# Patient Record
Sex: MALE | Race: OTHER | HISPANIC OR LATINO | Employment: FULL TIME | URBAN - METROPOLITAN AREA
[De-identification: names, ages, dates, MRNs, and addresses within clinical notes are randomized per-mention and may not be internally consistent; named-entity substitution may affect disease eponyms.]

---

## 2018-05-18 ENCOUNTER — OFFICE VISIT (OUTPATIENT)
Dept: FAMILY MEDICINE CLINIC | Facility: CLINIC | Age: 23
End: 2018-05-18
Payer: COMMERCIAL

## 2018-05-18 VITALS
HEIGHT: 68 IN | WEIGHT: 211.4 LBS | OXYGEN SATURATION: 98 % | BODY MASS INDEX: 32.04 KG/M2 | RESPIRATION RATE: 16 BRPM | SYSTOLIC BLOOD PRESSURE: 110 MMHG | TEMPERATURE: 97.8 F | HEART RATE: 88 BPM | DIASTOLIC BLOOD PRESSURE: 72 MMHG

## 2018-05-18 DIAGNOSIS — Z23 NEED FOR DIPHTHERIA-TETANUS-PERTUSSIS (TDAP) VACCINE: ICD-10-CM

## 2018-05-18 DIAGNOSIS — L74.510 HYPERHIDROSIS OF AXILLA: Primary | ICD-10-CM

## 2018-05-18 DIAGNOSIS — M20.009 ACQUIRED DEFORMITY OF FINGER, UNSPECIFIED LATERALITY: ICD-10-CM

## 2018-05-18 DIAGNOSIS — Z00.00 ROUTINE GENERAL MEDICAL EXAMINATION AT A HEALTH CARE FACILITY: ICD-10-CM

## 2018-05-18 DIAGNOSIS — E66.09 CLASS 1 OBESITY DUE TO EXCESS CALORIES WITHOUT SERIOUS COMORBIDITY WITH BODY MASS INDEX (BMI) OF 32.0 TO 32.9 IN ADULT: ICD-10-CM

## 2018-05-18 PROCEDURE — 99203 OFFICE O/P NEW LOW 30 MIN: CPT | Performed by: PHYSICIAN ASSISTANT

## 2018-05-18 PROCEDURE — 3725F SCREEN DEPRESSION PERFORMED: CPT | Performed by: PHYSICIAN ASSISTANT

## 2018-05-18 PROCEDURE — 1036F TOBACCO NON-USER: CPT | Performed by: PHYSICIAN ASSISTANT

## 2018-05-18 PROCEDURE — 3008F BODY MASS INDEX DOCD: CPT | Performed by: PHYSICIAN ASSISTANT

## 2018-05-18 NOTE — ASSESSMENT & PLAN NOTE
- Discussed importance of diet for management of weight  Encouraged both aerobic and resistance exercise 150 minutes a week  Pt is satisfied with his weight and activity level  - CBC, CMP, TSH, Lipid panel  Pt want the lab slips but stated he does not like needles and will not get these done

## 2018-05-18 NOTE — PROGRESS NOTES
Assessment/Plan:    Class 1 obesity due to excess calories without serious comorbidity with body mass index (BMI) of 32 0 to 32 9 in adult  - Discussed importance of diet for management of weight  Encouraged both aerobic and resistance exercise 150 minutes a week  Pt is satisfied with his weight and activity level  - CBC, CMP, TSH, Lipid panel  Pt want the lab slips but stated he does not like needles and will not get these done  Need for diphtheria-tetanus-pertussis (Tdap) vaccine  - Recommended Tetanus vaccination  Pt will return after checking when he last his vaccine  Hyperhidrosis of axilla  - Drysol to be allied nightly  - FU in 3 months    Acquired deformity of finger  Unknown etiology, no irritation   - Recommended continued observation  - Referral to Dermatologist       Diagnoses and all orders for this visit:    Hyperhidrosis of axilla  -     Discontinue: aluminum chloride (DRYSOL) 20 % external solution; Apply topically daily at bedtime  -     aluminum chloride (DRYSOL) 20 % external solution; Apply topically daily at bedtime    Class 1 obesity due to excess calories without serious comorbidity with body mass index (BMI) of 32 0 to 32 9 in adult  -     Lipid panel; Future  -     CBC and differential; Future  -     Comprehensive metabolic panel; Future  -     TSH, 3rd generation with T4 reflex; Future  -     Lipid panel  -     CBC and differential  -     Comprehensive metabolic panel  -     TSH, 3rd generation with T4 reflex    Need for diphtheria-tetanus-pertussis (Tdap) vaccine    Routine general medical examination at a health care facility    Acquired deformity of finger, unspecified laterality  -     Ambulatory referral to Dermatology; Future          Subjective:      Patient ID: Sharron Nicole is a 20 y o  male, here for an annual wellness visit  He wants a solution to his excessive axillary sweating   He has had significant sweatiness for most of his life but 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  He has tried every deodorant with no change  It causes no pain and no change  He is also inquiring about the slight skin peeling of finger tips which has been occurring for the past year  It is most noticeable on his thumbs  He will have to peal the skin off in the shower approximately every few months when it does not come off on its own  He denies any contact with an irritation, cleaning solution or substance that started this  He does not have any pain and no other skin pealing anywhere else on his body  Prior PCP: Dr Fay Blanc  Previous Dental Visit: 6 months prior, no known dental issues  Previous Eye Exam: Never prior, over time worsening vision    Prior Vaccines:   - Last Tetanus vaccine: 9 years prior    Diet: high in carbohydrates  Activity: twice weekly rides bike    The following portions of the patient's history were reviewed and updated as appropriate: allergies, current medications, past family history, past medical history, past social history, past surgical history and problem list     Review of Systems   Constitutional: Negative for activity change, chills, fatigue, fever and unexpected weight change  HENT: Negative for rhinorrhea and sore throat  Respiratory: Negative for cough, shortness of breath and wheezing  Cardiovascular: Negative for chest pain, palpitations and leg swelling  Gastrointestinal: Negative for constipation, diarrhea, nausea and vomiting  Genitourinary: Negative for urgency  Musculoskeletal: Negative for back pain, neck pain and neck stiffness  Allergic/Immunologic: Positive for environmental allergies  Negative for food allergies  Neurological: Negative for dizziness, seizures, weakness and headaches  Psychiatric/Behavioral: Negative for dysphoric mood and sleep disturbance  The patient is not nervous/anxious          Social History     Social History    Marital status: Single     Spouse name: N/A    Number of children: N/A    Years of education: N/A     Occupational History    Not on file  Social History Main Topics    Smoking status: Never Smoker    Smokeless tobacco: Never Used      Comment: 1 cigar a week    Alcohol use 1 8 oz/week     3 Shots of liquor per week      Comment: rare    Drug use: No    Sexual activity: No     Other Topics Concern    Not on file     Social History Narrative    No narrative on file         Objective:  /72   Pulse 88   Temp 97 8 °F (36 6 °C)   Resp 16   Ht 5' 8" (1 727 m)   Wt 95 9 kg (211 lb 6 4 oz)   SpO2 98%   BMI 32 14 kg/m²      Physical Exam   Constitutional: He appears well-developed and well-nourished  HENT:   Head: Normocephalic and atraumatic  Right Ear: Tympanic membrane and external ear normal    Left Ear: Tympanic membrane and external ear normal    Nose: Nose normal  No rhinorrhea  Mouth/Throat: Oropharynx is clear and moist  No oropharyngeal exudate  Neck: Normal range of motion  No thyromegaly present  Cardiovascular: Normal rate, regular rhythm and normal heart sounds  Exam reveals no gallop and no friction rub  No murmur heard  Pulmonary/Chest: Effort normal and breath sounds normal  He has no wheezes  He has no rales  Abdominal: Soft  Bowel sounds are normal  He exhibits no distension  There is no tenderness  There is no rebound and no guarding  Musculoskeletal: Normal range of motion  Lymphadenopathy:        Head (right side): No submental, no submandibular, no tonsillar, no preauricular and no posterior auricular adenopathy present  Head (left side): No submental, no submandibular, no tonsillar, no preauricular and no posterior auricular adenopathy present  He has no cervical adenopathy  Skin: Skin is warm and intact  He is not diaphoretic  No pallor  Psychiatric: He has a normal mood and affect   His behavior is normal  Thought content normal

## 2019-01-11 ENCOUNTER — OFFICE VISIT (OUTPATIENT)
Dept: FAMILY MEDICINE CLINIC | Facility: CLINIC | Age: 24
End: 2019-01-11
Payer: COMMERCIAL

## 2019-01-11 VITALS
HEART RATE: 85 BPM | WEIGHT: 221.6 LBS | HEIGHT: 68 IN | RESPIRATION RATE: 18 BRPM | SYSTOLIC BLOOD PRESSURE: 122 MMHG | DIASTOLIC BLOOD PRESSURE: 80 MMHG | OXYGEN SATURATION: 97 % | BODY MASS INDEX: 33.59 KG/M2

## 2019-01-11 DIAGNOSIS — L74.510 HYPERHIDROSIS OF AXILLA: Primary | ICD-10-CM

## 2019-01-11 DIAGNOSIS — Z23 NEED FOR DIPHTHERIA-TETANUS-PERTUSSIS (TDAP) VACCINE: ICD-10-CM

## 2019-01-11 DIAGNOSIS — E66.09 CLASS 1 OBESITY DUE TO EXCESS CALORIES WITHOUT SERIOUS COMORBIDITY WITH BODY MASS INDEX (BMI) OF 32.0 TO 32.9 IN ADULT: ICD-10-CM

## 2019-01-11 PROCEDURE — 3008F BODY MASS INDEX DOCD: CPT | Performed by: FAMILY MEDICINE

## 2019-01-11 PROCEDURE — 99214 OFFICE O/P EST MOD 30 MIN: CPT | Performed by: FAMILY MEDICINE

## 2019-01-11 RX ORDER — MULTIVITAMIN
1 TABLET ORAL DAILY
COMMUNITY

## 2019-01-11 NOTE — PROGRESS NOTES
Assessment/Plan:    Hyperhidrosis of axilla  Continued symptoms  Well managed on Drysol  - Drysol to be applied nightly  Refill sent  - Referral to Dermatologist    Class 1 obesity due to excess calories without serious comorbidity with body mass index (BMI) of 32 0 to 32 9 in adult  - Discussed importance of diet for management of weight  Encouraged both aerobic and resistance exercise 150 minutes a week  Pt is satisfied with his weight and activity level  - Recommended getting labwork  Patient states he will not get labs drawn because he "hates" needles  Need for diphtheria-tetanus-pertussis (Tdap) vaccine  - Recommended once again  Patient declined  Subjective:    Patient ID: Christina Dwyer is a 24 y o  male  Pt is presenting today for Continued excessive axillary sweatiness  He was given Drysol 8 months prior which improved his symptoms  However he notices that about twice a month he gets red burning sensation which lasts for a day  Described at "red ants biting "    He is requesting a referral to see dermatology for long term management  He has increased his weight and has not made any changes to his health to improve  He does not exercise or watch his diet  The following portions of the patient's history were reviewed and updated as appropriate: allergies, current medications, past social history and problem list     Review of Systems      Objective:  /80   Pulse 85   Resp 18   Ht 5' 8" (1 727 m)   Wt 101 kg (221 lb 9 6 oz)   SpO2 97%   BMI 33 69 kg/m²      Physical Exam   Constitutional: He is oriented to person, place, and time  He appears well-developed and well-nourished  No distress  HENT:   Head: Normocephalic and atraumatic  Cardiovascular: Normal rate, regular rhythm, normal heart sounds and intact distal pulses  Exam reveals no gallop and no friction rub  No murmur heard  Pulmonary/Chest: Effort normal and breath sounds normal  No respiratory distress  Neurological: He is alert and oriented to person, place, and time  Skin: Skin is warm and dry  He is not diaphoretic  Normal appearing axilla   Psychiatric: He has a normal mood and affect  His behavior is normal  Thought content normal    Vitals reviewed

## 2019-01-11 NOTE — ASSESSMENT & PLAN NOTE
Continued symptoms  Well managed on Drysol  - Drysol to be applied nightly   Refill sent  - Referral to Dermatologist

## 2019-01-11 NOTE — ASSESSMENT & PLAN NOTE
- Discussed importance of diet for management of weight  Encouraged both aerobic and resistance exercise 150 minutes a week  Pt is satisfied with his weight and activity level  - Recommended getting labwork  Patient states he will not get labs drawn because he "hates" needles

## 2019-01-21 ENCOUNTER — HOSPITAL ENCOUNTER (EMERGENCY)
Facility: HOSPITAL | Age: 24
Discharge: HOME | End: 2019-01-21
Attending: EMERGENCY MEDICINE
Payer: COMMERCIAL

## 2019-01-21 ENCOUNTER — APPOINTMENT (EMERGENCY)
Dept: CT IMAGING | Facility: HOSPITAL | Age: 24
End: 2019-01-21
Payer: COMMERCIAL

## 2019-01-21 VITALS
OXYGEN SATURATION: 99 % | TEMPERATURE: 98.6 F | BODY MASS INDEX: 33.75 KG/M2 | RESPIRATION RATE: 16 BRPM | SYSTOLIC BLOOD PRESSURE: 142 MMHG | WEIGHT: 222.66 LBS | HEIGHT: 68 IN | DIASTOLIC BLOOD PRESSURE: 77 MMHG | HEART RATE: 75 BPM

## 2019-01-21 DIAGNOSIS — R10.32 LEFT INGUINAL PAIN: Primary | ICD-10-CM

## 2019-01-21 LAB
ANION GAP SERPL CALCULATED.3IONS-SCNC: 9 MMOL/L (ref 4–13)
BASOPHILS # BLD AUTO: 0.01 THOUSANDS/ΜL (ref 0–0.1)
BASOPHILS NFR BLD AUTO: 0 % (ref 0–1)
BUN SERPL-MCNC: 13 MG/DL (ref 5–25)
CALCIUM SERPL-MCNC: 9 MG/DL (ref 8.3–10.1)
CHLORIDE SERPL-SCNC: 106 MMOL/L (ref 100–108)
CO2 SERPL-SCNC: 28 MMOL/L (ref 21–32)
CREAT SERPL-MCNC: 1.03 MG/DL (ref 0.6–1.3)
EOSINOPHIL # BLD AUTO: 0.08 THOUSAND/ΜL (ref 0–0.61)
EOSINOPHIL NFR BLD AUTO: 1 % (ref 0–6)
ERYTHROCYTE [DISTWIDTH] IN BLOOD BY AUTOMATED COUNT: 12.5 % (ref 11.6–15.1)
GFR SERPL CREATININE-BSD FRML MDRD: 101 ML/MIN/1.73SQ M
GLUCOSE SERPL-MCNC: 120 MG/DL (ref 65–140)
HCT VFR BLD AUTO: 48 % (ref 36.5–49.3)
HGB BLD-MCNC: 16.5 G/DL (ref 12–17)
IMM GRANULOCYTES # BLD AUTO: 0.01 THOUSAND/UL (ref 0–0.2)
IMM GRANULOCYTES NFR BLD AUTO: 0 % (ref 0–2)
LYMPHOCYTES # BLD AUTO: 1.55 THOUSANDS/ΜL (ref 0.6–4.47)
LYMPHOCYTES NFR BLD AUTO: 27 % (ref 14–44)
MCH RBC QN AUTO: 29.9 PG (ref 26.8–34.3)
MCHC RBC AUTO-ENTMCNC: 34.4 G/DL (ref 31.4–37.4)
MCV RBC AUTO: 87 FL (ref 82–98)
MONOCYTES # BLD AUTO: 0.7 THOUSAND/ΜL (ref 0.17–1.22)
MONOCYTES NFR BLD AUTO: 12 % (ref 4–12)
NEUTROPHILS # BLD AUTO: 3.36 THOUSANDS/ΜL (ref 1.85–7.62)
NEUTS SEG NFR BLD AUTO: 60 % (ref 43–75)
NRBC BLD AUTO-RTO: 0 /100 WBCS
PLATELET # BLD AUTO: 212 THOUSANDS/UL (ref 149–390)
PMV BLD AUTO: 9.4 FL (ref 8.9–12.7)
POTASSIUM SERPL-SCNC: 4.1 MMOL/L (ref 3.5–5.3)
RBC # BLD AUTO: 5.51 MILLION/UL (ref 3.88–5.62)
SODIUM SERPL-SCNC: 143 MMOL/L (ref 136–145)
WBC # BLD AUTO: 5.71 THOUSAND/UL (ref 4.31–10.16)

## 2019-01-21 PROCEDURE — 99284 EMERGENCY DEPT VISIT MOD MDM: CPT

## 2019-01-21 PROCEDURE — 74177 CT ABD & PELVIS W/CONTRAST: CPT

## 2019-01-21 PROCEDURE — 80048 BASIC METABOLIC PNL TOTAL CA: CPT | Performed by: EMERGENCY MEDICINE

## 2019-01-21 PROCEDURE — 36415 COLL VENOUS BLD VENIPUNCTURE: CPT | Performed by: EMERGENCY MEDICINE

## 2019-01-21 PROCEDURE — 85025 COMPLETE CBC W/AUTO DIFF WBC: CPT | Performed by: EMERGENCY MEDICINE

## 2019-01-21 RX ORDER — NAPROXEN 500 MG/1
500 TABLET ORAL 2 TIMES DAILY PRN
Qty: 20 TABLET | Refills: 0 | Status: SHIPPED | OUTPATIENT
Start: 2019-01-21

## 2019-01-21 RX ADMIN — IOHEXOL 100 ML: 350 INJECTION, SOLUTION INTRAVENOUS at 12:58

## 2019-01-21 NOTE — ED NOTES
Pt provided verbal understanding of all discharge instructions   Pt ambulated to waiting room, steady gait noted     Shivani Jennings RN  01/21/19 3299

## 2019-01-21 NOTE — ED PROVIDER NOTES
History  Chief Complaint   Patient presents with    Groin Pain     L groin pain  [facility], sent him here to r/o hernia     43-year-old male, intermittent left groin pain over the past few days, better with rest worse with movement palpation , nonradiating, current pain score is 6/10  Patient seen at urgent care center, they were concern for incarcerated hernia and sent patient here        History provided by:  Patient  Groin Pain   Presenting symptoms: no dysuria    Presenting symptoms comment:  Left inguinal pain  Context: not after injury, not after intercourse, not after urination and not during urination    Relieved by:  Rest  Worsened by: Movement  Ineffective treatments:  None tried  Associated symptoms: groin pain    Associated symptoms: no abdominal pain, no diarrhea, no fever, no flank pain, no genital itching, no genital lesions, no genital rash, no nausea, no penile redness, no penile swelling, no priapism, no scrotal swelling, no urinary frequency, no urinary hesitation, no urinary incontinence, no urinary retention and no vomiting    Risk factors: no bladder surgery and no kidney stones        Prior to Admission Medications   Prescriptions Last Dose Informant Patient Reported? Taking? Multiple Vitamin (MULTIVITAMIN) tablet   Yes Yes   Sig: Take 1 tablet by mouth daily   aluminum chloride (DRYSOL) 20 % external solution   No Yes   Sig: Apply topically daily at bedtime      Facility-Administered Medications: None       History reviewed  No pertinent past medical history  Past Surgical History:   Procedure Laterality Date    ARTHROSCOPIC REPAIR ACL         History reviewed  No pertinent family history  I have reviewed and agree with the history as documented      Social History   Substance Use Topics    Smoking status: Never Smoker    Smokeless tobacco: Never Used      Comment: 1 cigar a week    Alcohol use 1 8 oz/week     3 Shots of liquor per week      Comment: rare        Review of Systems   Constitutional: Negative for activity change, chills, diaphoresis and fever  HENT: Negative for congestion, sinus pressure and sore throat  Eyes: Negative for pain and visual disturbance  Respiratory: Negative for cough, chest tightness, shortness of breath, wheezing and stridor  Cardiovascular: Negative for chest pain and palpitations  Gastrointestinal: Negative for abdominal distention, abdominal pain, constipation, diarrhea, nausea and vomiting  Genitourinary: Negative for bladder incontinence, dysuria, flank pain, frequency, hesitancy, penile swelling and scrotal swelling  Musculoskeletal: Negative for neck pain and neck stiffness  Skin: Negative for rash  Neurological: Negative for dizziness, speech difficulty, light-headedness, numbness and headaches  Physical Exam  Physical Exam   Constitutional: He is oriented to person, place, and time  He appears well-developed  No distress  HENT:   Head: Normocephalic and atraumatic  Eyes: Pupils are equal, round, and reactive to light  Neck: Normal range of motion  Neck supple  No tracheal deviation present  Cardiovascular: Normal rate, regular rhythm, normal heart sounds and intact distal pulses  No murmur heard  Pulmonary/Chest: Effort normal and breath sounds normal  No stridor  No respiratory distress  Abdominal: Soft  He exhibits no distension  There is tenderness  There is no rebound and no guarding  Left lower quadrant left inguinal tenderness   Musculoskeletal: Normal range of motion  Neurological: He is alert and oriented to person, place, and time  Skin: Skin is warm and dry  He is not diaphoretic  No erythema  No pallor  Psychiatric: He has a normal mood and affect  Vitals reviewed        Vital Signs  ED Triage Vitals   Temperature Pulse Respirations Blood Pressure SpO2   01/21/19 1157 01/21/19 1158 01/21/19 1158 01/21/19 1158 01/21/19 1158   98 6 °F (37 °C) 75 16 142/77 99 %      Temp src Heart Rate Source Patient Position - Orthostatic VS BP Location FiO2 (%)   -- 01/21/19 1158 01/21/19 1158 01/21/19 1158 --    Monitor Sitting Right arm       Pain Score       01/21/19 1158       6           Vitals:    01/21/19 1158   BP: 142/77   Pulse: 75   Patient Position - Orthostatic VS: Sitting       Visual Acuity      ED Medications  Medications   iohexol (OMNIPAQUE) 350 MG/ML injection (MULTI-DOSE) 100 mL (100 mL Intravenous Given 1/21/19 1258)       Diagnostic Studies  Results Reviewed     Procedure Component Value Units Date/Time    Basic metabolic panel [[id number]] Collected:  01/21/19 1213    Lab Status:  Final result Specimen:  Blood from Arm, Right Updated:  01/21/19 1236     Sodium 143 mmol/L      Potassium 4 1 mmol/L      Chloride 106 mmol/L      CO2 28 mmol/L      ANION GAP 9 mmol/L      BUN 13 mg/dL      Creatinine 1 03 mg/dL      Glucose 120 mg/dL      Calcium 9 0 mg/dL      eGFR 101 ml/min/1 73sq m     Narrative:         National Kid[employer] Disease Education Program recommendations are as follows:  GFR calculation is accurate only with a steady state creatinine  Chronic Kidney disease less than 60 ml/min/1 73 sq  meters  Kidney failure less than 15 ml/min/1 73 sq  meters      CBC and differential [[id number]] Collected:  01/21/19 1213    Lab Status:  Final result Specimen:  Blood from Arm, Right Updated:  01/21/19 1218     WBC 5 71 Thousand/uL      RBC 5 51 Million/uL      Hemoglobin 16 5 g/dL      Hematocrit 48 0 %      MCV 87 fL      MCH 29 9 pg      MCHC 34 4 g/dL      RDW 12 5 %      MPV 9 4 fL      Platelets 630 Thousands/uL      nRBC 0 /100 WBCs      Neutrophils Relative 60 %      Immat GRANS % 0 %      Lymphocytes Relative 27 %      Monocytes Relative 12 %      Eosinophils Relative 1 %      Basophils Relative 0 %      Neutrophils Absolute 3 36 Thousands/µL      Immature Grans Absolute 0 01 Thousand/uL      Lymphocytes Absolute 1 55 Thousands/µL      Monocytes Absolute 0 70 Thousand/µL      Eosinophils Absolute 0 08 Thousand/µL      Basophils Absolute 0 01 Thousands/µL                  CT abdomen pelvis with contrast   Final Result by Dov Ray MD (01/21 1413)      No acute findings within abdomen and pelvis  Workstation performed: [id number]                    Procedures  Procedures       Phone Contacts  ED Phone Contact    ED Course  ED Course as of Jan 21 1416   Mon Jan 21, 2019   1416 Reviewing CT scan, no acute pathology found by Radiology, I do see on the CT scan with looks a swollen left inguinal lymph node, this likely is the cause of patient's discomfort , no leukocytosis no fevers no evidence of lymphadenitis that would warrant antibiotics  Will discharge PCP follow-up                                MDM  Number of Diagnoses or Management Options  Left inguinal pain: new and requires workup     Amount and/or Complexity of Data Reviewed  Clinical lab tests: ordered and reviewed  Tests in the radiology section of CPT®: ordered and reviewed  Independent visualization of images, tracings, or specimens: yes      CritCare Time    Disposition  Final diagnoses:   Left inguinal pain     Time reflects when diagnosis was documented in both MDM as applicable and the Disposition within this note     Time User Action Codes Description Comment    1/21/2019  2:15 PM Jose Lee Add [R10 [id number]] Left inguinal pain       ED Disposition     ED Disposition Condition Comment    Discharge  Alyse Scales discharge to home/self care  Condition at discharge: Good        Follow-up Information    None         Patient's Medications   Discharge Prescriptions    No medications on file     No discharge procedures on file      ED Provider  Electronically Signed by           Dory Mcneil DO  01/21/19 1416

## 2019-01-21 NOTE — ED NOTES
RN informed of IV infiltration from CT scan  IV removed  Cold compress applied   Will make Dr Rylie Brewer aware     Donya Marcelo, RN  01/21/19 4467